# Patient Record
Sex: MALE | Race: OTHER | NOT HISPANIC OR LATINO | URBAN - METROPOLITAN AREA
[De-identification: names, ages, dates, MRNs, and addresses within clinical notes are randomized per-mention and may not be internally consistent; named-entity substitution may affect disease eponyms.]

---

## 2020-08-06 ENCOUNTER — EMERGENCY (EMERGENCY)
Facility: HOSPITAL | Age: 50
LOS: 1 days | Discharge: HOME | End: 2020-08-06
Attending: EMERGENCY MEDICINE
Payer: SELF-PAY

## 2020-08-06 VITALS
RESPIRATION RATE: 18 BRPM | WEIGHT: 164.91 LBS | TEMPERATURE: 98 F | SYSTOLIC BLOOD PRESSURE: 174 MMHG | DIASTOLIC BLOOD PRESSURE: 98 MMHG | OXYGEN SATURATION: 98 % | HEART RATE: 91 BPM | HEIGHT: 66 IN

## 2020-08-06 PROCEDURE — 99284 EMERGENCY DEPT VISIT MOD MDM: CPT

## 2020-08-06 PROCEDURE — 70450 CT HEAD/BRAIN W/O DYE: CPT | Mod: 26

## 2020-08-06 PROCEDURE — 71046 X-RAY EXAM CHEST 2 VIEWS: CPT | Mod: 26

## 2020-08-06 PROCEDURE — 70450 CT HEAD/BRAIN W/O DYE: CPT

## 2020-08-06 PROCEDURE — 71046 X-RAY EXAM CHEST 2 VIEWS: CPT

## 2020-08-06 PROCEDURE — 99284 EMERGENCY DEPT VISIT MOD MDM: CPT | Mod: 25

## 2020-08-06 RX ORDER — ACETAMINOPHEN 500 MG
650 TABLET ORAL ONCE
Refills: 0 | Status: COMPLETED | OUTPATIENT
Start: 2020-08-06 | End: 2020-08-06

## 2020-08-06 RX ADMIN — Medication 650 MILLIGRAM(S): at 23:28

## 2020-08-06 NOTE — ED PROVIDER NOTE - NSFOLLOWUPINSTRUCTIONS_ED_ALL_ED_FT
Your workup today did not show any dangerous conditions     You have a concussion. You may continue to feel headaches, dizziness, nausea, for the next few days. As long as it is not worsening, you do not need to worry.    Rest as much as possible and avoid any activity that may predispose you to another head injury.    Take tylenol as needed for pain.    Follow up with your pcp doctor if symptoms persist for more than a few days.    Return to the ED if you have severe pain, inability to eat or drink, fever, or any other major concern.

## 2020-08-06 NOTE — ED PROVIDER NOTE - CHPI ED SYMPTOMS NEG
no blurred vision/no change in level of consciousness/no dizziness/no fever/no weakness/no loss of consciousness/no nausea/no vomiting
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
## 2020-08-06 NOTE — ED ADULT NURSE NOTE - CHPI ED NUR SYMPTOMS NEG
no crying/no decreased eating/drinking/no difficulty bearing weight/no fussiness/no dizziness/no neck tenderness/no sleeping issues/no disorientation/no laceration/no bruising/no acting out behaviors/no back pain/no loss of consciousness
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
## 2020-08-06 NOTE — ED PROVIDER NOTE - PATIENT PORTAL LINK FT
You can access the FollowMyHealth Patient Portal offered by [facility] by registering at the following website: [email or web address]. By joining [employer]’s FollowMyHealth portal, you will also be able to view your health information using other applications (apps) compatible with our system.

## 2020-08-07 VITALS
SYSTOLIC BLOOD PRESSURE: 162 MMHG | HEART RATE: 78 BPM | OXYGEN SATURATION: 98 % | DIASTOLIC BLOOD PRESSURE: 108 MMHG | RESPIRATION RATE: 18 BRPM | TEMPERATURE: 98 F

## 2020-08-07 RX ADMIN — Medication 650 MILLIGRAM(S): at 01:29

## 2020-08-07 NOTE — ED ADULT NURSE REASSESSMENT NOTE - NS ED NURSE REASSESS COMMENT FT1
pt blood pressure elevated, md villatoro made aware. pt denies chest pain, sob, headache. as per md pt okay to be discharged home.

## 2020-11-15 ENCOUNTER — INPATIENT (INPATIENT)
Facility: HOSPITAL | Age: 50
LOS: 0 days | Discharge: HOME | DRG: 202 | End: 2020-11-15
Attending: INTERNAL MEDICINE | Admitting: INTERNAL MEDICINE
Payer: MEDICAID

## 2020-11-15 VITALS
TEMPERATURE: 98 F | OXYGEN SATURATION: 95 % | RESPIRATION RATE: 40 BRPM | SYSTOLIC BLOOD PRESSURE: 177 MMHG | HEART RATE: 112 BPM | DIASTOLIC BLOOD PRESSURE: 128 MMHG | HEIGHT: 66 IN | WEIGHT: 175.05 LBS

## 2020-11-15 VITALS — HEART RATE: 115 BPM | OXYGEN SATURATION: 95 % | RESPIRATION RATE: 19 BRPM

## 2020-11-15 DIAGNOSIS — J96.00 ACUTE RESPIRATORY FAILURE, UNSPECIFIED WHETHER WITH HYPOXIA OR HYPERCAPNIA: ICD-10-CM

## 2020-11-15 PROBLEM — Z78.9 OTHER SPECIFIED HEALTH STATUS: Chronic | Status: ACTIVE | Noted: 2020-08-06

## 2020-11-15 LAB
ALBUMIN SERPL ELPH-MCNC: 5.3 G/DL — HIGH (ref 3.3–5)
ALP SERPL-CCNC: 85 U/L — SIGNIFICANT CHANGE UP (ref 40–120)
ALT FLD-CCNC: 31 U/L — SIGNIFICANT CHANGE UP (ref 10–45)
ANION GAP SERPL CALC-SCNC: 15 MMOL/L — SIGNIFICANT CHANGE UP (ref 5–17)
ANISOCYTOSIS BLD QL: SLIGHT — SIGNIFICANT CHANGE UP
AST SERPL-CCNC: 32 U/L — SIGNIFICANT CHANGE UP (ref 10–40)
BASOPHILS # BLD AUTO: 0 K/UL — SIGNIFICANT CHANGE UP (ref 0–0.2)
BASOPHILS NFR BLD AUTO: 0 % — SIGNIFICANT CHANGE UP (ref 0–2)
BILIRUB SERPL-MCNC: 1.2 MG/DL — SIGNIFICANT CHANGE UP (ref 0.2–1.2)
BUN SERPL-MCNC: 9 MG/DL — SIGNIFICANT CHANGE UP (ref 7–23)
CALCIUM SERPL-MCNC: 9.8 MG/DL — SIGNIFICANT CHANGE UP (ref 8.4–10.5)
CHLORIDE SERPL-SCNC: 98 MMOL/L — SIGNIFICANT CHANGE UP (ref 96–108)
CO2 SERPL-SCNC: 24 MMOL/L — SIGNIFICANT CHANGE UP (ref 22–31)
CREAT SERPL-MCNC: 0.81 MG/DL — SIGNIFICANT CHANGE UP (ref 0.5–1.3)
DACRYOCYTES BLD QL SMEAR: SLIGHT — SIGNIFICANT CHANGE UP
ELLIPTOCYTES BLD QL SMEAR: SLIGHT — SIGNIFICANT CHANGE UP
EOSINOPHIL # BLD AUTO: 0.44 K/UL — SIGNIFICANT CHANGE UP (ref 0–0.5)
EOSINOPHIL NFR BLD AUTO: 2.6 % — SIGNIFICANT CHANGE UP (ref 0–6)
GAS PNL BLDV: SIGNIFICANT CHANGE UP
GLUCOSE SERPL-MCNC: 108 MG/DL — HIGH (ref 70–99)
HCT VFR BLD CALC: 48.9 % — SIGNIFICANT CHANGE UP (ref 39–50)
HGB BLD-MCNC: 15.5 G/DL — SIGNIFICANT CHANGE UP (ref 13–17)
LYMPHOCYTES # BLD AUTO: 12.2 % — LOW (ref 13–44)
LYMPHOCYTES # BLD AUTO: 2.06 K/UL — SIGNIFICANT CHANGE UP (ref 1–3.3)
MACROCYTES BLD QL: SLIGHT — SIGNIFICANT CHANGE UP
MANUAL SMEAR VERIFICATION: SIGNIFICANT CHANGE UP
MCHC RBC-ENTMCNC: 22.9 PG — LOW (ref 27–34)
MCHC RBC-ENTMCNC: 31.7 GM/DL — LOW (ref 32–36)
MCV RBC AUTO: 72.2 FL — LOW (ref 80–100)
MONOCYTES # BLD AUTO: 1.32 K/UL — HIGH (ref 0–0.9)
MONOCYTES NFR BLD AUTO: 7.8 % — SIGNIFICANT CHANGE UP (ref 2–14)
NEUTROPHILS # BLD AUTO: 13.06 K/UL — HIGH (ref 1.8–7.4)
NEUTROPHILS NFR BLD AUTO: 77.4 % — HIGH (ref 43–77)
PLAT MORPH BLD: NORMAL — SIGNIFICANT CHANGE UP
PLATELET # BLD AUTO: 240 K/UL — SIGNIFICANT CHANGE UP (ref 150–400)
POIKILOCYTOSIS BLD QL AUTO: SLIGHT — SIGNIFICANT CHANGE UP
POLYCHROMASIA BLD QL SMEAR: SLIGHT — SIGNIFICANT CHANGE UP
POTASSIUM SERPL-MCNC: 3.9 MMOL/L — SIGNIFICANT CHANGE UP (ref 3.5–5.3)
POTASSIUM SERPL-SCNC: 3.9 MMOL/L — SIGNIFICANT CHANGE UP (ref 3.5–5.3)
PROT SERPL-MCNC: 8.4 G/DL — HIGH (ref 6–8.3)
RAPID RVP RESULT: DETECTED
RBC # BLD: 6.77 M/UL — HIGH (ref 4.2–5.8)
RBC # FLD: 18.9 % — HIGH (ref 10.3–14.5)
RBC BLD AUTO: ABNORMAL
RV+EV RNA SPEC QL NAA+PROBE: DETECTED
SARS-COV-2 RNA SPEC QL NAA+PROBE: SIGNIFICANT CHANGE UP
SODIUM SERPL-SCNC: 137 MMOL/L — SIGNIFICANT CHANGE UP (ref 135–145)
TARGETS BLD QL SMEAR: SLIGHT — SIGNIFICANT CHANGE UP
WBC # BLD: 16.87 K/UL — HIGH (ref 3.8–10.5)
WBC # FLD AUTO: 16.87 K/UL — HIGH (ref 3.8–10.5)

## 2020-11-15 PROCEDURE — 80053 COMPREHEN METABOLIC PANEL: CPT

## 2020-11-15 PROCEDURE — 85014 HEMATOCRIT: CPT

## 2020-11-15 PROCEDURE — 96365 THER/PROPH/DIAG IV INF INIT: CPT

## 2020-11-15 PROCEDURE — 82947 ASSAY GLUCOSE BLOOD QUANT: CPT

## 2020-11-15 PROCEDURE — 71045 X-RAY EXAM CHEST 1 VIEW: CPT | Mod: 26

## 2020-11-15 PROCEDURE — 84132 ASSAY OF SERUM POTASSIUM: CPT

## 2020-11-15 PROCEDURE — 84295 ASSAY OF SERUM SODIUM: CPT

## 2020-11-15 PROCEDURE — 94640 AIRWAY INHALATION TREATMENT: CPT

## 2020-11-15 PROCEDURE — 96375 TX/PRO/DX INJ NEW DRUG ADDON: CPT

## 2020-11-15 PROCEDURE — 99291 CRITICAL CARE FIRST HOUR: CPT | Mod: 25

## 2020-11-15 PROCEDURE — 93005 ELECTROCARDIOGRAM TRACING: CPT

## 2020-11-15 PROCEDURE — 82803 BLOOD GASES ANY COMBINATION: CPT

## 2020-11-15 PROCEDURE — 85018 HEMOGLOBIN: CPT

## 2020-11-15 PROCEDURE — 96361 HYDRATE IV INFUSION ADD-ON: CPT

## 2020-11-15 PROCEDURE — 85025 COMPLETE CBC W/AUTO DIFF WBC: CPT

## 2020-11-15 PROCEDURE — 0225U NFCT DS DNA&RNA 21 SARSCOV2: CPT

## 2020-11-15 PROCEDURE — 82330 ASSAY OF CALCIUM: CPT

## 2020-11-15 PROCEDURE — 96372 THER/PROPH/DIAG INJ SC/IM: CPT | Mod: XU

## 2020-11-15 PROCEDURE — 71045 X-RAY EXAM CHEST 1 VIEW: CPT

## 2020-11-15 PROCEDURE — 82435 ASSAY OF BLOOD CHLORIDE: CPT

## 2020-11-15 PROCEDURE — 99291 CRITICAL CARE FIRST HOUR: CPT

## 2020-11-15 PROCEDURE — 83605 ASSAY OF LACTIC ACID: CPT

## 2020-11-15 RX ORDER — ALBUTEROL 90 UG/1
4 AEROSOL, METERED ORAL
Refills: 0 | Status: COMPLETED | OUTPATIENT
Start: 2020-11-15 | End: 2020-11-15

## 2020-11-15 RX ORDER — EPINEPHRINE 0.3 MG/.3ML
0.3 INJECTION INTRAMUSCULAR; SUBCUTANEOUS ONCE
Refills: 0 | Status: COMPLETED | OUTPATIENT
Start: 2020-11-15 | End: 2020-11-15

## 2020-11-15 RX ORDER — ALBUTEROL 90 UG/1
2 AEROSOL, METERED ORAL
Qty: 1 | Refills: 0
Start: 2020-11-15 | End: 2020-12-14

## 2020-11-15 RX ORDER — ALBUTEROL 90 UG/1
2 AEROSOL, METERED ORAL ONCE
Refills: 0 | Status: COMPLETED | OUTPATIENT
Start: 2020-11-15 | End: 2020-11-15

## 2020-11-15 RX ORDER — INFLUENZA VIRUS VACCINE 15; 15; 15; 15 UG/.5ML; UG/.5ML; UG/.5ML; UG/.5ML
0.5 SUSPENSION INTRAMUSCULAR ONCE
Refills: 0 | Status: COMPLETED | OUTPATIENT
Start: 2020-11-15 | End: 2020-11-15

## 2020-11-15 RX ORDER — IPRATROPIUM/ALBUTEROL SULFATE 18-103MCG
3 AEROSOL WITH ADAPTER (GRAM) INHALATION ONCE
Refills: 0 | Status: COMPLETED | OUTPATIENT
Start: 2020-11-15 | End: 2020-11-15

## 2020-11-15 RX ORDER — SODIUM CHLORIDE 9 MG/ML
1000 INJECTION INTRAMUSCULAR; INTRAVENOUS; SUBCUTANEOUS ONCE
Refills: 0 | Status: COMPLETED | OUTPATIENT
Start: 2020-11-15 | End: 2020-11-15

## 2020-11-15 RX ORDER — IPRATROPIUM/ALBUTEROL SULFATE 18-103MCG
3 AEROSOL WITH ADAPTER (GRAM) INHALATION EVERY 6 HOURS
Refills: 0 | Status: DISCONTINUED | OUTPATIENT
Start: 2020-11-15 | End: 2020-11-15

## 2020-11-15 RX ORDER — BUDESONIDE AND FORMOTEROL FUMARATE DIHYDRATE 160; 4.5 UG/1; UG/1
2 AEROSOL RESPIRATORY (INHALATION)
Qty: 1 | Refills: 0
Start: 2020-11-15 | End: 2020-12-14

## 2020-11-15 RX ORDER — MAGNESIUM SULFATE 500 MG/ML
2 VIAL (ML) INJECTION ONCE
Refills: 0 | Status: COMPLETED | OUTPATIENT
Start: 2020-11-15 | End: 2020-11-15

## 2020-11-15 RX ADMIN — ALBUTEROL 4 PUFF(S): 90 AEROSOL, METERED ORAL at 08:55

## 2020-11-15 RX ADMIN — Medication 125 MILLIGRAM(S): at 08:20

## 2020-11-15 RX ADMIN — Medication 3 MILLILITER(S): at 10:30

## 2020-11-15 RX ADMIN — Medication 3 MILLILITER(S): at 11:10

## 2020-11-15 RX ADMIN — Medication 2 GRAM(S): at 09:15

## 2020-11-15 RX ADMIN — ALBUTEROL 2 PUFF(S): 90 AEROSOL, METERED ORAL at 08:10

## 2020-11-15 RX ADMIN — SODIUM CHLORIDE 1000 MILLILITER(S): 9 INJECTION INTRAMUSCULAR; INTRAVENOUS; SUBCUTANEOUS at 09:15

## 2020-11-15 RX ADMIN — EPINEPHRINE 0.3 MILLIGRAM(S): 0.3 INJECTION INTRAMUSCULAR; SUBCUTANEOUS at 08:16

## 2020-11-15 RX ADMIN — Medication 50 GRAM(S): at 08:13

## 2020-11-15 RX ADMIN — ALBUTEROL 4 PUFF(S): 90 AEROSOL, METERED ORAL at 08:25

## 2020-11-15 RX ADMIN — ALBUTEROL 4 PUFF(S): 90 AEROSOL, METERED ORAL at 08:35

## 2020-11-15 RX ADMIN — SODIUM CHLORIDE 1000 MILLILITER(S): 9 INJECTION INTRAMUSCULAR; INTRAVENOUS; SUBCUTANEOUS at 10:30

## 2020-11-15 RX ADMIN — Medication 3 MILLILITER(S): at 10:50

## 2020-11-15 RX ADMIN — SODIUM CHLORIDE 2000 MILLILITER(S): 9 INJECTION INTRAMUSCULAR; INTRAVENOUS; SUBCUTANEOUS at 09:30

## 2020-11-15 RX ADMIN — ALBUTEROL 4 PUFF(S): 90 AEROSOL, METERED ORAL at 08:45

## 2020-11-15 RX ADMIN — SODIUM CHLORIDE 1000 MILLILITER(S): 9 INJECTION INTRAMUSCULAR; INTRAVENOUS; SUBCUTANEOUS at 08:12
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
## 2020-11-15 NOTE — ED ADULT NURSE NOTE - NSIMPLEMENTINTERV_GEN_ALL_ED
Implemented All Fall Risk Interventions:  [place] to call system. Call bell, personal items and telephone within reach. Instruct patient to call for assistance. Room bathroom lighting operational. Non-slip footwear when patient is off stretcher. Physically safe environment: no spills, clutter or unnecessary equipment. Stretcher in lowest position, wheels locked, appropriate side rails in place. Provide visual cue, wrist band, yellow gown, etc. Monitor gait and stability. Monitor for mental status changes and reorient to person, place, and time. Review medications for side effects contributing to fall risk. Reinforce activity limits and safety measures with patient and family.

## 2020-11-15 NOTE — ED PROCEDURE NOTE - PROCEDURE ADDITIONAL DETAILS
Lung sliding b/l, A line predominant Lung sliding b/l, A line predominant  This ultrasound was performed for educational purposes.  Follow up chest x-ray

## 2020-11-15 NOTE — DISCHARGE NOTE PROVIDER - NSDCCPCAREPLAN_GEN_ALL_CORE_FT
PRINCIPAL DISCHARGE DIAGNOSIS  Diagnosis: Mild asthma exacerbation  Assessment and Plan of Treatment: HOME CARE INSTRUCTIONS  Take medicines as directed by your caregiver.  SEEK MEDICAL CARE IF:  You have wheezing, shortness of breath, or a cough even if taking medicine to prevent attacks.   You have thickening of sputum.   Your sputum changes from clear or white to yellow, green, gray, or bloody.   You have any problems that may be related to the medicines you are taking (such as a rash, itching, swelling, or trouble breathing).  You are using a reliever medicine more than 2–3 times per week.  Your peak flow is still at 50–79% of personal best after following your action plan for 1 hour.  SEEK IMMEDIATE MEDICAL CARE IF:  You are short of breath even at rest.  You get short of breath when doing very little physical activity.  You have difficulty eating, drinking, or talking due to asthma symptoms.  You have chest pain or you feel that your heart is beating fast.   You have a bluish color to your lips or fingernails.  You are lightheaded, dizzy, or faint.  You have a fever or persistent symptoms for more than 2–3 days.   You have a fever and symptoms suddenly get worse.  You seem to be getting worse and are unresponsive to treatment during an asthma attack.   Your peak flow is less than 50% of personal best.

## 2020-11-15 NOTE — ED PROVIDER NOTE - PROGRESS NOTE DETAILS
somewhat improved after 10+ puffs albuterol, epinephrine, cont careful montioring rr now low 30s, 3-4 word sentances. we have been doing more puffs than is ordered in chart. somewhat improved after 10+ puffs albuterol, epinephrine, cont careful monitoring rr now low 30s, 3-4 word sentences. we have been doing more puffs than is ordered in chart. RR in 18-22 range, able to speak in full sentence, and sit comfortably. Able to perform coordinated inhalation after education. Improved wheezing on posterior auscultations, still with prominent anterior upper airway rhonchi. 100% on room air. Desat to 92-94% on room air, started on O2 via NC. Wheezing and rhonchi improved, but still prominent. Covid (-) and entero/rhino (+), will start Duoneb and get peak flow. Needs admission. Desat to 92-94% on room air, started on O2 via NC, SaO2 100% on O2. Wheezing and rhonchi improved, but still prominent. Covid (-) and entero/rhino (+), will start Duoneb. Peak flow 200-210 (about 40%). Needs admission. Patient agreed. Signout given to accepting attending.

## 2020-11-15 NOTE — ED PROVIDER NOTE - CRITICAL CARE PROVIDED
interpretation of diagnostic studies/direct patient care (not related to procedure)/documentation/additional history taking/consultation with other physicians interpretation of diagnostic studies/direct patient care (not related to procedure)/documentation/additional history taking

## 2020-11-15 NOTE — DISCHARGE NOTE NURSING/CASE MANAGEMENT/SOCIAL WORK - PATIENT PORTAL LINK FT
You can access the FollowMyHealth Patient Portal offered by [facility] by registering at the following website: [email or web address]. By joining [employer] [facility]’s FollowMyHealth portal, you will also be able to view your health information using other applications (apps) compatible with our system.

## 2020-11-15 NOTE — ED PROVIDER NOTE - PHYSICAL EXAMINATION
PHYSICAL EXAM:  GENERAL: Sitting in tripod position, in moderate respiratory distress  HEENT:  Head atraumatic, EOMI, PERRLA, conjunctiva clear; Moist mucous membranes  NECK: Supple, No JVD, no lymphadenopathy, no thyroid nodules or enlargement; (+) paraspinal tenderness at R  CHEST/LUNG: Diffuse severe wheezing b/l, prominent upper airway rhonchi; no focal consolidation. 97% on room air; (+) abdominal breathing  HEART: Regular rhythm, tachycardic; extra heart sounds difficult to appreciate 2/2 prominent breath sounds  ABDOMEN: Bowel sounds present; Soft, Nontender, Nondistended. No hepatomegaly  EXTREMITIES: No clubbing, cyanosis, or edema  NERVOUS SYSTEM:  Alert & Oriented X 3, non-focal and spontaneous movements of all extremities  SKIN: No rashes or lesions
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
## 2020-11-15 NOTE — H&P ADULT - NSHPLABSRESULTS_GEN_ALL_CORE
LABS:                        15.5   16.87 )-----------( 240      ( 15 Nov 2020 08:19 )             48.9     11-15    137  |  98  |  9   ----------------------------<  108<H>  3.9   |  24  |  0.81    Ca    9.8      15 Nov 2020 08:19    TPro  8.4<H>  /  Alb  5.3<H>  /  TBili  1.2  /  DBili  x   /  AST  32  /  ALT  31  /  AlkPhos  85  11-15            RADIOLOGY & ADDITIONAL TESTS:

## 2020-11-15 NOTE — ED PROVIDER NOTE - CARE PLAN
Principal Discharge DX:	Acute respiratory failure, unspecified whether with hypoxia or hypercapnia  Secondary Diagnosis:	Severe asthma with exacerbation, unspecified whether persistent
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
## 2020-11-15 NOTE — ED PROVIDER NOTE - ATTENDING CONTRIBUTION TO CARE
sob, asthmatic, no fever  tachypnic to 40, prolonged exp time, exp wheeze, 2 word sent  pt w resp failure from asthma. epi / albuterol / mag / steroid. may need bipap. nebs currently not permitted per nursing. sob, asthmatic, no fever  tripoding, tachypnic to 40, prolonged exp time, exp wheeze, 2 word sent  pt w resp failure from asthma. epi / albuterol / mag / steroid. may need bipap. nebs currently not permitted per nursing.

## 2020-11-15 NOTE — ED ADULT NURSE REASSESSMENT NOTE - NS ED NURSE REASSESS COMMENT FT1
note for 10:30am: O2 sat decreased to 92-94%, pt started on O2 4L NC by Dr. Saavedra.  Wheezing improved, peak flow 200 -210  Dr. Saavedra informed

## 2020-11-15 NOTE — ED ADULT NURSE REASSESSMENT NOTE - NS ED NURSE REASSESS COMMENT FT1
note for 0827am:  Pt somewhat improved after 10+ puffs ventolin given by MD,  epinephrine IM given by MD  note for 08:37. Respirations low 30s, 3-4 word sentences note for 0827am:  Pt somewhat improved after 10+ puffs ventolin given by MD,  epinephrine IM given by MD  note for 08:37am. Respirations low 30s, 3-4 word sentence   note for 08:42am:  Respiration  18-22 range, able to speak in full sentence, sitting comfortably.  Able to perform coordinated inhalation after education. Improved wheezing on posterior auscultations, still with prominent anterior upper airway rhonchi. 100% on room air note for 0827am:  Pt somewhat improved after 10+ puffs ventolin given by MD,  epinephrine IM given by MD  note for 08:37am. Respirations low 30s, 3-4 word sentence   note for 08:42am:  Respiration  18-22 range, able to speak in full sentence, sitting comfortably.  Pt educated on inhalation, able to perform with return demonstration.  Wheezing improved. note for 0827am:  Pt somewhat improved after 10+ puffs albuterol given by MD,  epinephrine IM given by MD  note for 08:37am. Respirations low 30s, 3-4 word sentence   note for 08:42am:  Respiration  18-22 range, able to speak in full sentence, sitting comfortably.  Pt educated on inhalation, able to perform with return demonstration.  Wheezing improved. note for 08:27am:  Pt somewhat improved after 10+ puffs albuterol given by MD,  epinephrine IM given by MD  note for 08:37am.  Respirations low 30s, 3-4 word sentence   note for 08:42am:  Respiration  18-22 range, able to speak in full sentence, sitting comfortably.  Pt educated on inhalation, able to perform with return demonstration.  Wheezing improved.

## 2020-11-15 NOTE — DISCHARGE NOTE PROVIDER - NSDCMRMEDTOKEN_GEN_ALL_CORE_FT
MethylPREDNISolone Dose Pack 4 mg oral tablet: TAKE DOSE PACK AS DIRECTED  Symbicort 160 mcg-4.5 mcg/inh inhalation aerosol: 2 puff(s) inhaled every 12 hours   Ventolin HFA 90 mcg/inh inhalation aerosol: 2 puff(s) inhaled 4 times a day, As Needed for shortness of breath/wheezing

## 2020-11-15 NOTE — H&P ADULT - NSHPPHYSICALEXAM_GEN_ALL_CORE
PHYSICAL EXAMINATION:  Vital Signs Last 24 Hrs  T(C): 36.7 (15 Nov 2020 11:41), Max: 37.2 (15 Nov 2020 08:12)  T(F): 98.1 (15 Nov 2020 11:41), Max: 99 (15 Nov 2020 09:30)  HR: 122 (15 Nov 2020 11:41) (112 - 143)  BP: 123/72 (15 Nov 2020 11:41) (123/72 - 177/128)  BP(mean): --  RR: 20 (15 Nov 2020 11:41) (20 - 41)  SpO2: 96% (15 Nov 2020 11:41) (94% - 98%)  CAPILLARY BLOOD GLUCOSE          GENERAL: NAD, well-groomed, well-developed  HEAD:  atraumatic, normocephalic  EYES: sclera anicteric  ENMT: mucous membranes moist  NECK: supple, No JVD  CHEST/LUNG: clear to auscultation bilaterally; no rales, rhonchi, or wheezing b/l  HEART: normal S1, S2  ABDOMEN: BS+, soft, ND, NT   EXTREMITIES:  pulses palpable; no clubbing, cyanosis, or edema b/l LEs  NEURO: awake, alert, interactive; moves all extremities  SKIN: no rashes or lesions PHYSICAL EXAMINATION:  Vital Signs Last 24 Hrs  T(C): 36.7 (15 Nov 2020 11:41), Max: 37.2 (15 Nov 2020 08:12)  T(F): 98.1 (15 Nov 2020 11:41), Max: 99 (15 Nov 2020 09:30)  HR: 122 (15 Nov 2020 11:41) (112 - 143)  BP: 123/72 (15 Nov 2020 11:41) (123/72 - 177/128)  BP(mean): --  RR: 20 (15 Nov 2020 11:41) (20 - 41)  SpO2: 96% (15 Nov 2020 11:41) (94% - 98%)  CAPILLARY BLOOD GLUCOSE          GENERAL: NAD, well-groomed, well-developed,   seen in ER, comfortable on RA, no wheeze or SOB  HEAD:  atraumatic, normocephalic  EYES: sclera anicteric  ENMT: mucous membranes moist  NECK: supple, No JVD  CHEST/LUNG: clear to auscultation bilaterally; no rales, rhonchi, or wheezing b/l  HEART: normal S1, S2  ABDOMEN: BS+, soft, ND, NT   EXTREMITIES:  pulses palpable; no clubbing, cyanosis, or edema b/l LEs  NEURO: awake, alert, interactive; moves all extremities  SKIN: no rashes or lesions
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
## 2023-11-15 NOTE — ED ADULT NURSE NOTE - HIV OFFER
Previously Declined (within the last year) Advancement-Rotation Flap Text: The defect edges were debeveled with a #15 scalpel blade. Given the location of the defect, shape of the defect and the proximity to free margins an advancement-rotation flap was deemed most appropriate. Using a sterile surgical marker, an appropriate flap was drawn incorporating the defect and placing the expected incisions within the relaxed skin tension lines where possible. The area thus outlined was incised deep to adipose tissue with a #15 scalpel blade. The skin margins were undermined to an appropriate distance in all directions utilizing iris scissors. Following this, the designed flap was carried over into the primary defect and sutured into place.

## 2024-06-09 NOTE — ED ADULT NURSE NOTE - NSFALLRSKASSISTTYPE_ED_ALL_ED
POA  VQ scan low suspicion for PE  Echo with normal EF and grade 1 diastolic dysfunction  Cardiology input appreciated  ASA on hold for renal biopsy   Denies any further chest pain complaints today   Toileting

## 2025-02-01 NOTE — H&P ADULT - NSICDXNOPASTMEDICALHX_GEN_ALL_CORE
<-- Click to add NO pertinent Past Medical History Attending Attestation (For Attendings USE Only)...